# Patient Record
(demographics unavailable — no encounter records)

---

## 2025-06-11 NOTE — HISTORY OF PRESENT ILLNESS
[FreeTextEntry1] : The patient is a 72-year-old female referred for arrhythmia management. The patient has a history of syncope, sinus bradycardia and questionable atrial fibrillation. Her records report a diagnosis of long-standing persistent AF. However, when recently seen by her cardiologist she was noted to be in sinus rhythm. She subsequently wore an outpatient monitor which did not show any AF. Some sinus bradycardia was noted, and metoprolol was discontinued. Her previous episodes of syncope appear to be vasovagal in nature and are typically associated with nausea and sweating. Most recent TTE reported normal EF and normal LA size. The patient denies any symptoms of palpitations, shortness of breath, dizziness, chest pain, or extremity edema. She denies ever been on oral anticoagulation.

## 2025-06-11 NOTE — DISCUSSION/SUMMARY
[FreeTextEntry1] : In summary, the patient is a 72-year-old female with a history of sinus bradycardia, syncope and questionable atrial fibrillation. Her records are reported to have a diagnosis of long-standing persistent atrial fibrillation. However, there is no documentation of AF, and she is in SR on ECGs and Holter monitoring. She has never been on AC or notes any symptoms consistent with AF. This is likely a diagnosis entered in error. Syncopal episodes appear vagal in nature with only one episode in the last 7 years. Agree with discontinuing metoprolol given mild sinus bradycardia and no clear indication for the medication. No further evaluation is recommended at present.  [EKG obtained to assist in diagnosis and management of assessed problem(s)] : EKG obtained to assist in diagnosis and management of assessed problem(s)